# Patient Record
Sex: FEMALE | Race: WHITE | Employment: FULL TIME | URBAN - METROPOLITAN AREA
[De-identification: names, ages, dates, MRNs, and addresses within clinical notes are randomized per-mention and may not be internally consistent; named-entity substitution may affect disease eponyms.]

---

## 2019-09-12 ENCOUNTER — OFFICE VISIT (OUTPATIENT)
Dept: FAMILY MEDICINE CLINIC | Age: 39
End: 2019-09-12

## 2019-09-12 VITALS
SYSTOLIC BLOOD PRESSURE: 113 MMHG | TEMPERATURE: 97.8 F | DIASTOLIC BLOOD PRESSURE: 69 MMHG | HEIGHT: 64 IN | OXYGEN SATURATION: 100 % | RESPIRATION RATE: 18 BRPM | WEIGHT: 143 LBS | HEART RATE: 87 BPM | BODY MASS INDEX: 24.41 KG/M2

## 2019-09-12 DIAGNOSIS — N92.6 MISSED MENSES: Primary | ICD-10-CM

## 2019-09-12 DIAGNOSIS — Z3A.11 11 WEEKS GESTATION OF PREGNANCY: ICD-10-CM

## 2019-09-12 LAB
HCG URINE, QL. (POC): POSITIVE
VALID INTERNAL CONTROL?: YES

## 2019-09-12 NOTE — PROGRESS NOTES
Sumit Ram is a 35 y.o.  female and presents with    Chief Complaint   Patient presents with    New Patient    Missed Menses         Subjective:  Patient presents to establish care and for c/o pregnancy. Patient states she believes her last period was 07/07/2019. Home pregnancy test positive. She has not seen anyone for the pregnancy. She also states she has been taking prenatals for 4 weeks. She is requesting care for pregnancy. Current Outpatient Medications   Medication Sig Dispense Refill    OTHER        Allergies   Allergen Reactions    Pcn [Penicillins] Rash     Past Medical History:   Diagnosis Date    Migraines      Past Surgical History:   Procedure Laterality Date    HX BREAST AUGMENTATION      HX LEEP PROCEDURE      HX RHINOPLASTY      HX TONSILLECTOMY      IR CHOLECYSTOSTOMY PERCUTANEOUS       Family History   Problem Relation Age of Onset    High Cholesterol Mother     Hypertension Father     Herniated Disc Father      Social History     Tobacco Use    Smoking status: Current Every Day Smoker    Smokeless tobacco: Never Used    Tobacco comment: one cig. per day   Substance Use Topics    Alcohol use: Not Currently       Review of Systems   Constitutional: Negative for chills and fever. HENT: Negative. Eyes: Negative for blurred vision. Respiratory: Negative for shortness of breath. Cardiovascular: Negative for chest pain and leg swelling. Gastrointestinal: Negative for abdominal pain, constipation, diarrhea, nausea and vomiting. Genitourinary:        + pregnancy   Musculoskeletal: Negative. Skin: Negative. Neurological: Negative for headaches. Psychiatric/Behavioral: Negative.           Objective:  Vitals:    09/12/19 1514   BP: 113/69   Pulse: 87   Resp: 18   Temp: 97.8 °F (36.6 °C)   TempSrc: Oral   SpO2: 100%   Weight: 143 lb (64.9 kg)   Height: 5' 4.47\" (1.638 m)   PainSc:   0 - No pain   LMP: 07/07/2019     General:   Well-groomed, well-nourished, in no distress, pleasant, alert, appropriate    Eyes:    PERRL, EOMI. conjunctivae/corneas clear. Ears:               Ear canals clear; TM's wnl  Mouth:  MMM, oropharynx without exudate, petechiae or ulcers  Neck:      Neck supple, no swelling, mass or tenderness or thyromegaly  Cardiovasc:   RRR,  no murmur, rubs or gallops. Pulmonary:    Lungs clear bilaterally, no wheezing, rales or rhonchi. Abdomen:   Abdomen soft, normal bowel sounds. No masses, tenderness,    rebound/rigidity or CVA tenderness. No hepatosplenomegaly. Assessment/Plan:      1. Missed menses  - AMB POC URINE PREGNANCY TEST, VISUAL COLOR COMPARISON  - REFERRAL TO OBSTETRICS AND GYNECOLOGY    2. 11 weeks gestation of pregnancy  Discussed with patient we do not do OB in ths office and that we will refer her to OB. She is agreeable discussed due date as approx 03/29/2020-04/17/2020 based on approx dates. 11 weeks 3 days.   - REFERRAL TO OBSTETRICS AND GYNECOLOGY    I have discussed the diagnosis with the patient and the intended plan as seen in the above orders. The patient has received an after-visit summary and questions were answered concerning future plans. I have discussed medication side effects and warnings with the patient as well. I have reviewed the plan of care with the patient, accepted their input and they are in agreement with the treatment goals. More than 1/2 of this 30 minute visit was spent face to face in counselling and coordination of care, as described above. This document may have been created with the aid of dictation software. Text may contain errors, particularly phonetic errors.      Prudence Battles FNP-BC

## 2019-09-12 NOTE — PATIENT INSTRUCTIONS
LIZZ 04/17/2019    LMP 07/07/2019    11 weeks 3 days     Referral to [facility]

## 2019-09-12 NOTE — PROGRESS NOTES
Olga Galan is a 35 y.o. female (: 1980) presenting to address:    Chief Complaint   Patient presents with    New Patient    Missed Menses       Vitals:    19 1514   BP: 113/69   Pulse: 87   Resp: 18   Temp: 97.8 °F (36.6 °C)   TempSrc: Oral   SpO2: 100%   Weight: 143 lb (64.9 kg)   Height: 5' 4.47\" (1.638 m)   PainSc:   0 - No pain   LMP: 2019       Hearing/Vision:   No exam data present    Learning Assessment:     Learning Assessment 2019   PRIMARY LEARNER Patient   HIGHEST LEVEL OF EDUCATION - PRIMARY LEARNER  4 YEARS OF COLLEGE   BARRIERS PRIMARY LEARNER NONE   CO-LEARNER CAREGIVER No   PRIMARY LANGUAGE ENGLISH   LEARNER PREFERENCE PRIMARY READING   ANSWERED BY patient    RELATIONSHIP SELF     Depression Screening:     3 most recent PHQ Screens 2019   Little interest or pleasure in doing things Not at all   Feeling down, depressed, irritable, or hopeless Not at all   Total Score PHQ 2 0     Fall Risk Assessment:   No flowsheet data found. Abuse Screening:     Abuse Screening Questionnaire 2019   Do you ever feel afraid of your partner? N   Are you in a relationship with someone who physically or mentally threatens you? N   Is it safe for you to go home? Y     Coordination of Care Questionaire:   1. Have you been to the ER, urgent care clinic since your last visit? Hospitalized since your last visit? NO    2. Have you seen or consulted any other health care providers outside of the [address] [place] [address] since your last visit? Include any pap smears or colon screening. NO    Advanced Directive:   1. Do you have an Advanced Directive? NO    2. Would you like information on Advanced Directives?  NO      Patient declined flu shot

## 2020-04-26 PROBLEM — Z34.90 PREGNANCY: Status: ACTIVE | Noted: 2020-04-26

## 2022-03-20 PROBLEM — Z34.90 PREGNANCY: Status: ACTIVE | Noted: 2020-04-26

## 2023-01-31 RX ORDER — IBUPROFEN 800 MG/1
800 TABLET ORAL EVERY 6 HOURS PRN
COMMUNITY
Start: 2020-04-29